# Patient Record
Sex: MALE
[De-identification: names, ages, dates, MRNs, and addresses within clinical notes are randomized per-mention and may not be internally consistent; named-entity substitution may affect disease eponyms.]

---

## 2022-08-02 PROBLEM — Z00.00 ENCOUNTER FOR PREVENTIVE HEALTH EXAMINATION: Status: ACTIVE | Noted: 2022-08-02

## 2022-08-17 ENCOUNTER — APPOINTMENT (OUTPATIENT)
Dept: COLORECTAL SURGERY | Facility: CLINIC | Age: 47
End: 2022-08-17

## 2022-08-17 VITALS
WEIGHT: 190 LBS | HEIGHT: 66 IN | HEART RATE: 91 BPM | SYSTOLIC BLOOD PRESSURE: 160 MMHG | DIASTOLIC BLOOD PRESSURE: 116 MMHG | BODY MASS INDEX: 30.53 KG/M2

## 2022-08-17 DIAGNOSIS — K62.5 HEMORRHAGE OF ANUS AND RECTUM: ICD-10-CM

## 2022-08-17 DIAGNOSIS — K59.09 OTHER CONSTIPATION: ICD-10-CM

## 2022-08-17 PROCEDURE — 99203 OFFICE O/P NEW LOW 30 MIN: CPT

## 2022-08-17 NOTE — CONSULT LETTER
[Dear  ___] : Dear  [unfilled], [Consult Letter:] : I had the pleasure of evaluating your patient, [unfilled]. [Please see my note below.] : Please see my note below. [Consult Closing:] : Thank you very much for allowing me to participate in the care of this patient.  If you have any questions, please do not hesitate to contact me. [Sincerely,] : Sincerely, [FreeTextEntry2] : Ronnie Caicedo [FreeTextEntry3] : Garret Chambers MD FACS\par Chief Colon and Rectal Surgery\par [facility]

## 2022-08-17 NOTE — ASSESSMENT
[FreeTextEntry1] : Chronic contipation, rectal bleeding\par -Pt w/ pelvic cyst of unclear etiology.  Report of CT scan reviewed and small<2cm simple cyst described. \par -Pt to obtain cd copy of CT for review\par -I recommended pt start fiber supplement daily for constipation\par -Will schedule colonoscopy given worsening constipation and episode of rectal bleeding.  will evaluate for outlet bleeding at time of colonscopy,\par -all questions answered

## 2022-08-17 NOTE — HISTORY OF PRESENT ILLNESS
[FreeTextEntry1] : 47 yo male w/ chronic constipation. Pt reports the need for multiple laxatives to have BMs every 2 days.  No fevers or chills.  This has been ongoing for 15 years.  Colonoscopy almost 3 years ago was otherwise normal.  Pt reports episode of large blood per rectum 2 months ago.  Stable weight.  No family hx of colon cancer or IBD.  Recent CT showed small pelvic cyst at level of seminal vesicles and small nephrolithiasis

## 2022-08-17 NOTE — PHYSICAL EXAM
[Abdomen Masses] : No abdominal masses [Abdomen Tenderness] : ~T No ~M abdominal tenderness [JVD] : no jugular venous distention  [Wheezing] : no wheezing was heard [Normal Rate and Rhythm] : normal rate and rhythm [No Rash or Lesion] : No rash or lesion [Alert] : alert [Oriented to Person] : oriented to person [Oriented to Place] : oriented to place [Oriented to Time] : oriented to time [Calm] : calm [de-identified] : NAD [de-identified] : EOMI [de-identified] : CABALLERO no edema